# Patient Record
Sex: FEMALE | Race: OTHER | Employment: OTHER | URBAN - METROPOLITAN AREA
[De-identification: names, ages, dates, MRNs, and addresses within clinical notes are randomized per-mention and may not be internally consistent; named-entity substitution may affect disease eponyms.]

---

## 2017-08-15 ENCOUNTER — HOSPITAL ENCOUNTER (INPATIENT)
Facility: HOSPITAL | Age: 77
LOS: 2 days | Discharge: HOME | DRG: 123 | End: 2017-08-17
Attending: EMERGENCY MEDICINE | Admitting: HOSPITALIST
Payer: MEDICARE

## 2017-08-15 ENCOUNTER — APPOINTMENT (OUTPATIENT)
Dept: MRI IMAGING | Facility: HOSPITAL | Age: 77
DRG: 123 | End: 2017-08-15
Attending: EMERGENCY MEDICINE
Payer: MEDICARE

## 2017-08-15 DIAGNOSIS — H53.9 VISION CHANGES: Primary | ICD-10-CM

## 2017-08-15 DIAGNOSIS — H53.8 BLURRED VISION, LEFT EYE: ICD-10-CM

## 2017-08-15 PROBLEM — H46.9 OPTIC NEURITIS: Status: ACTIVE | Noted: 2017-08-15

## 2017-08-15 LAB
ANION GAP SERPL CALC-SCNC: 9 MMOL/L (ref 0–18)
APTT PPP: 25.1 SECONDS (ref 23.2–35.3)
BASOPHILS # BLD: 0.1 K/UL (ref 0–0.2)
BASOPHILS NFR BLD: 1 %
BUN SERPL-MCNC: 23 MG/DL (ref 8–20)
BUN/CREAT SERPL: 20 (ref 10–20)
CALCIUM SERPL-MCNC: 9.8 MG/DL (ref 8.5–10.5)
CHLORIDE SERPL-SCNC: 106 MMOL/L (ref 95–110)
CO2 SERPL-SCNC: 23 MMOL/L (ref 22–32)
CREAT SERPL-MCNC: 1.15 MG/DL (ref 0.5–1.5)
EOSINOPHIL # BLD: 0.7 K/UL (ref 0–0.7)
EOSINOPHIL NFR BLD: 5 %
ERYTHROCYTE [DISTWIDTH] IN BLOOD BY AUTOMATED COUNT: 16.1 % (ref 11–15)
ERYTHROCYTE [SEDIMENTATION RATE] IN BLOOD: 42 MM/HR (ref 0–30)
GLUCOSE SERPL-MCNC: 91 MG/DL (ref 70–99)
HCT VFR BLD AUTO: 34.8 % (ref 35–48)
HGB BLD-MCNC: 11.1 G/DL (ref 12–16)
INR BLD: 1 (ref 0.9–1.2)
LYMPHOCYTES # BLD: 4.2 K/UL (ref 1–4)
LYMPHOCYTES NFR BLD: 34 %
MCH RBC QN AUTO: 25.6 PG (ref 27–32)
MCHC RBC AUTO-ENTMCNC: 32 G/DL (ref 32–37)
MCV RBC AUTO: 79.9 FL (ref 80–100)
MONOCYTES # BLD: 0.9 K/UL (ref 0–1)
MONOCYTES NFR BLD: 8 %
NEUTROPHILS # BLD AUTO: 6.6 K/UL (ref 1.8–7.7)
NEUTROPHILS NFR BLD: 53 %
OSMOLALITY UR CALC.SUM OF ELEC: 289 MOSM/KG (ref 275–295)
PLATELET # BLD AUTO: 333 K/UL (ref 140–400)
PMV BLD AUTO: 8.5 FL (ref 7.4–10.3)
POTASSIUM SERPL-SCNC: 4.6 MMOL/L (ref 3.3–5.1)
PROTHROMBIN TIME: 12.3 SECONDS (ref 11.8–14.5)
RBC # BLD AUTO: 4.35 M/UL (ref 3.7–5.4)
SODIUM SERPL-SCNC: 138 MMOL/L (ref 136–144)
WBC # BLD AUTO: 12.4 K/UL (ref 4–11)

## 2017-08-15 PROCEDURE — 99223 1ST HOSP IP/OBS HIGH 75: CPT | Performed by: HOSPITALIST

## 2017-08-15 PROCEDURE — 70544 MR ANGIOGRAPHY HEAD W/O DYE: CPT | Performed by: EMERGENCY MEDICINE

## 2017-08-15 PROCEDURE — 70553 MRI BRAIN STEM W/O & W/DYE: CPT | Performed by: EMERGENCY MEDICINE

## 2017-08-15 RX ORDER — ASPIRIN 81 MG/1
81 TABLET, CHEWABLE ORAL DAILY
Status: DISCONTINUED | OUTPATIENT
Start: 2017-08-16 | End: 2017-08-17

## 2017-08-15 RX ORDER — ASPIRIN 325 MG
325 TABLET, DELAYED RELEASE (ENTERIC COATED) ORAL DAILY
Status: ON HOLD | COMMUNITY
End: 2017-08-17

## 2017-08-15 RX ORDER — DEXTROSE MONOHYDRATE 25 G/50ML
50 INJECTION, SOLUTION INTRAVENOUS AS NEEDED
Status: DISCONTINUED | OUTPATIENT
Start: 2017-08-15 | End: 2017-08-17

## 2017-08-15 RX ORDER — LORAZEPAM 2 MG/ML
INJECTION INTRAMUSCULAR
Status: DISPENSED
Start: 2017-08-15 | End: 2017-08-16

## 2017-08-15 RX ORDER — DILTIAZEM HYDROCHLORIDE 240 MG/1
240 CAPSULE, EXTENDED RELEASE ORAL DAILY
COMMUNITY

## 2017-08-15 RX ORDER — METHYLPREDNISOLONE SODIUM SUCCINATE 500 MG/1
500 INJECTION, POWDER, FOR SOLUTION INTRAMUSCULAR; INTRAVENOUS DAILY
Status: DISCONTINUED | OUTPATIENT
Start: 2017-08-16 | End: 2017-08-15

## 2017-08-15 RX ORDER — GLIMEPIRIDE 4 MG/1
4 TABLET ORAL
COMMUNITY

## 2017-08-15 RX ORDER — LOSARTAN POTASSIUM 50 MG/1
TABLET ORAL DAILY
COMMUNITY

## 2017-08-15 RX ORDER — SODIUM CHLORIDE 0.9 % (FLUSH) 0.9 %
3 SYRINGE (ML) INJECTION AS NEEDED
Status: DISCONTINUED | OUTPATIENT
Start: 2017-08-15 | End: 2017-08-17

## 2017-08-15 RX ORDER — MONTELUKAST SODIUM 10 MG/1
10 TABLET ORAL NIGHTLY
COMMUNITY

## 2017-08-15 RX ORDER — ACETAMINOPHEN 325 MG/1
650 TABLET ORAL EVERY 6 HOURS PRN
Status: DISCONTINUED | OUTPATIENT
Start: 2017-08-15 | End: 2017-08-17

## 2017-08-15 RX ORDER — METHYLPREDNISOLONE SODIUM SUCCINATE 500 MG/1
500 INJECTION, POWDER, FOR SOLUTION INTRAMUSCULAR; INTRAVENOUS ONCE
Status: COMPLETED | OUTPATIENT
Start: 2017-08-15 | End: 2017-08-15

## 2017-08-15 RX ORDER — ONDANSETRON 2 MG/ML
4 INJECTION INTRAMUSCULAR; INTRAVENOUS EVERY 6 HOURS PRN
Status: DISCONTINUED | OUTPATIENT
Start: 2017-08-15 | End: 2017-08-17

## 2017-08-15 RX ORDER — LORAZEPAM 2 MG/ML
0.5 INJECTION INTRAMUSCULAR ONCE
Status: COMPLETED | OUTPATIENT
Start: 2017-08-15 | End: 2017-08-15

## 2017-08-15 RX ORDER — ASPIRIN 81 MG/1
81 TABLET ORAL DAILY
Status: ON HOLD | COMMUNITY
End: 2017-08-17

## 2017-08-15 RX ORDER — BUPROPION HYDROCHLORIDE 150 MG/1
150 TABLET, EXTENDED RELEASE ORAL DAILY
COMMUNITY

## 2017-08-15 NOTE — ED INITIAL ASSESSMENT (HPI)
Pt came in from eye doctor for head scan due to decreasing vision. 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 Blurred vision in left eye since Sunday. Denies HA, only eye pain. Diagnosed with optic papillitis . Eyes dilated at office PTA.

## 2017-08-15 NOTE — ED NOTES
Family at bedside. Patient was sent here by eye doctor to get a scan of the left eye. Right eye is blind and the symptoms are the same. Patient is comfortable. No distress at the moment.

## 2017-08-15 NOTE — ED PROVIDER NOTES
Patient Seen in: [facility] AND [facility] Emergency Department    History   Patient presents with:   Eye Visual Problem (opthalmic)    Stated Complaint:     HPI    23-year-old female with history of diabetes, hypertension, asthma, and blindness to the right eye complaint:   Other systems are as noted in HPI. Constitutional and vital signs reviewed. All other systems reviewed and negative except as noted above. PSFH elements reviewed from today and agreed except as otherwise stated in HPI.     Physical Exa (*)     RDW 16.1 (*)     Lymphocyte Absolute 4.2 (*)     All other components within normal limits   PROTHROMBIN TIME (PT) - Normal   PTT, ACTIVATED - Normal   CBC WITH DIFFERENTIAL WITH PLATELET    Narrative:      The following orders were created for pane

## 2017-08-16 LAB
ANION GAP SERPL CALC-SCNC: 9 MMOL/L (ref 0–18)
BASOPHILS # BLD: 0 K/UL (ref 0–0.2)
BASOPHILS NFR BLD: 1 %
BUN SERPL-MCNC: 26 MG/DL (ref 8–20)
BUN/CREAT SERPL: 20.5 (ref 10–20)
CALCIUM SERPL-MCNC: 9.6 MG/DL (ref 8.5–10.5)
CHLORIDE SERPL-SCNC: 107 MMOL/L (ref 95–110)
CO2 SERPL-SCNC: 22 MMOL/L (ref 22–32)
CREAT SERPL-MCNC: 1.27 MG/DL (ref 0.5–1.5)
EOSINOPHIL # BLD: 0 K/UL (ref 0–0.7)
EOSINOPHIL NFR BLD: 0 %
ERYTHROCYTE [DISTWIDTH] IN BLOOD BY AUTOMATED COUNT: 16.3 % (ref 11–15)
GLUCOSE BLDC GLUCOMTR-MCNC: 246 MG/DL (ref 70–99)
GLUCOSE BLDC GLUCOMTR-MCNC: 269 MG/DL (ref 70–99)
GLUCOSE BLDC GLUCOMTR-MCNC: 284 MG/DL (ref 70–99)
GLUCOSE BLDC GLUCOMTR-MCNC: 330 MG/DL (ref 70–99)
GLUCOSE BLDC GLUCOMTR-MCNC: 344 MG/DL (ref 70–99)
GLUCOSE BLDC GLUCOMTR-MCNC: 434 MG/DL (ref 70–99)
GLUCOSE SERPL-MCNC: 312 MG/DL (ref 70–99)
HBA1C MFR BLD: 7 % (ref 4–6)
HCT VFR BLD AUTO: 33 % (ref 35–48)
HGB BLD-MCNC: 10.6 G/DL (ref 12–16)
LYMPHOCYTES # BLD: 1.1 K/UL (ref 1–4)
LYMPHOCYTES NFR BLD: 22 %
MCH RBC QN AUTO: 25.5 PG (ref 27–32)
MCHC RBC AUTO-ENTMCNC: 32.1 G/DL (ref 32–37)
MCV RBC AUTO: 79.5 FL (ref 80–100)
MONOCYTES # BLD: 0.1 K/UL (ref 0–1)
MONOCYTES NFR BLD: 1 %
NEUTROPHILS # BLD AUTO: 3.8 K/UL (ref 1.8–7.7)
NEUTROPHILS NFR BLD: 77 %
OSMOLALITY UR CALC.SUM OF ELEC: 303 MOSM/KG (ref 275–295)
PLATELET # BLD AUTO: 279 K/UL (ref 140–400)
PMV BLD AUTO: 8.6 FL (ref 7.4–10.3)
POTASSIUM SERPL-SCNC: 5 MMOL/L (ref 3.3–5.1)
RBC # BLD AUTO: 4.15 M/UL (ref 3.7–5.4)
SODIUM SERPL-SCNC: 138 MMOL/L (ref 136–144)
WBC # BLD AUTO: 5 K/UL (ref 4–11)

## 2017-08-16 PROCEDURE — 99232 SBSQ HOSP IP/OBS MODERATE 35: CPT | Performed by: HOSPITALIST

## 2017-08-16 RX ORDER — DILTIAZEM HYDROCHLORIDE 120 MG/1
240 CAPSULE, COATED, EXTENDED RELEASE ORAL DAILY
Status: DISCONTINUED | OUTPATIENT
Start: 2017-08-16 | End: 2017-08-17

## 2017-08-16 RX ORDER — MONTELUKAST SODIUM 10 MG/1
10 TABLET ORAL NIGHTLY
Status: DISCONTINUED | OUTPATIENT
Start: 2017-08-16 | End: 2017-08-17

## 2017-08-16 RX ORDER — BUPROPION HYDROCHLORIDE 150 MG/1
150 TABLET ORAL DAILY
Status: DISCONTINUED | OUTPATIENT
Start: 2017-08-16 | End: 2017-08-17

## 2017-08-16 RX ORDER — LOSARTAN POTASSIUM 50 MG/1
50 TABLET ORAL DAILY
Status: DISCONTINUED | OUTPATIENT
Start: 2017-08-16 | End: 2017-08-17

## 2017-08-16 NOTE — PROGRESS NOTES
[facility]    Progress Note    Rickford Lesches Patient Status:  Inpatient    1940 MRN [id number]   Location [facility] 3W/SW Attending Yu Pavon MD   Hosp Day # 1 PCP No primary care provider on file.        Keith Subcutaneous TID CC   • aspirin  81 mg Oral Daily   • methylPREDNISolone (Solu-MEDROL) IVPB  500 mg Intravenous Daily       Current PRN Inpatient Meds:      Normal Saline Flush, acetaminophen, ondansetron HCl, dextrose 50%, Glucose-Vitamin C    Results: involving the inner table of the bifrontal calvarium.       Ekg 12-lead    Result Date: 8/15/2017  ECG Report  Interpretation  -------------------------- Sinus Rhythm WITHIN NORMAL LIMITS No previous ECGs available Electronically signed on 08/16/2017 at 13:

## 2017-08-16 NOTE — PLAN OF CARE
Diabetes/Glucose Control    • Glucose maintained within prescribed range Not Progressing          Patient Centered Care    • Patient preferences are identified and integrated in the patient's plan of care Progressing        Bed low, locked.  Side rails up x

## 2017-08-16 NOTE — ED NOTES
Charge nurse and Dr Denise Dougherty notified that [address] unable to do US due to children present. Charge to fined someone to watch children.

## 2017-08-16 NOTE — H&P
[facility]    PATIENT'S NAME: Estrellita Aroldo   ATTENDING PHYSICIAN: Mathieu Hernandez MD   PATIENT ACCOUNT#:   [id number]    LOCATION:  [address]  MEDICAL RECORD #:   [id number]       YOB: 1940  ADMISSION DATE: PHYSICAL EXAMINATION:    GENERAL:  Alert, oriented to time, place, and person. No acute distress. VITAL SIGNS:  Temperature 98.0, pulse 76, respiratory rate 15, blood pressure 170/80, pulse ox 99% on room air. HEENT:  Atraumatic. Oropharynx clear.

## 2017-08-16 NOTE — CONSULTS
[facility]    PATIENT'S NAME: Oscar Nielsen   ATTENDING PHYSICIAN: Greg Souza MD   CONSULTING PHYSICIAN: Sharyle Asa.  Olga Romero MD   PATIENT ACCOUNT#:   [id number]    LOCATION:  [address] [place] [address] #:   [id number]       DATE OF BI rashes, sores, joint pain, or swelling. NEUROLOGIC:  Her  is symmetric. Her toes are downgoing. She has no sign of demyelinating disease on the MRI. ASSESSMENT AND PLAN:  Optic neuritis.   Given her age, this is more likely ischemic, although th

## 2017-08-17 ENCOUNTER — APPOINTMENT (OUTPATIENT)
Dept: CV DIAGNOSTICS | Facility: HOSPITAL | Age: 77
DRG: 123 | End: 2017-08-17
Attending: HOSPITALIST
Payer: MEDICARE

## 2017-08-17 VITALS
DIASTOLIC BLOOD PRESSURE: 68 MMHG | SYSTOLIC BLOOD PRESSURE: 162 MMHG | OXYGEN SATURATION: 96 % | HEART RATE: 79 BPM | RESPIRATION RATE: 18 BRPM | BODY MASS INDEX: 32.01 KG/M2 | TEMPERATURE: 99 F | HEIGHT: 58 IN | WEIGHT: 152.5 LBS

## 2017-08-17 LAB
ANION GAP SERPL CALC-SCNC: 9 MMOL/L (ref 0–18)
BASOPHILS # BLD: 0 K/UL (ref 0–0.2)
BASOPHILS NFR BLD: 0 %
BUN SERPL-MCNC: 31 MG/DL (ref 8–20)
BUN/CREAT SERPL: 26.7 (ref 10–20)
CALCIUM SERPL-MCNC: 9.2 MG/DL (ref 8.5–10.5)
CHLORIDE SERPL-SCNC: 107 MMOL/L (ref 95–110)
CO2 SERPL-SCNC: 22 MMOL/L (ref 22–32)
CREAT SERPL-MCNC: 1.16 MG/DL (ref 0.5–1.5)
EOSINOPHIL # BLD: 0 K/UL (ref 0–0.7)
EOSINOPHIL NFR BLD: 0 %
ERYTHROCYTE [DISTWIDTH] IN BLOOD BY AUTOMATED COUNT: 16.2 % (ref 11–15)
GLUCOSE BLDC GLUCOMTR-MCNC: 260 MG/DL (ref 70–99)
GLUCOSE BLDC GLUCOMTR-MCNC: 279 MG/DL (ref 70–99)
GLUCOSE BLDC GLUCOMTR-MCNC: 291 MG/DL (ref 70–99)
GLUCOSE BLDC GLUCOMTR-MCNC: 365 MG/DL (ref 70–99)
GLUCOSE SERPL-MCNC: 281 MG/DL (ref 70–99)
HCT VFR BLD AUTO: 31.1 % (ref 35–48)
HGB BLD-MCNC: 9.9 G/DL (ref 12–16)
LYMPHOCYTES # BLD: 1.2 K/UL (ref 1–4)
LYMPHOCYTES NFR BLD: 8 %
MCH RBC QN AUTO: 25.2 PG (ref 27–32)
MCHC RBC AUTO-ENTMCNC: 31.7 G/DL (ref 32–37)
MCV RBC AUTO: 79.5 FL (ref 80–100)
MONOCYTES # BLD: 0.4 K/UL (ref 0–1)
MONOCYTES NFR BLD: 2 %
NEUTROPHILS # BLD AUTO: 13.2 K/UL (ref 1.8–7.7)
NEUTROPHILS NFR BLD: 89 %
OSMOLALITY UR CALC.SUM OF ELEC: 303 MOSM/KG (ref 275–295)
PLATELET # BLD AUTO: 296 K/UL (ref 140–400)
PMV BLD AUTO: 9.6 FL (ref 7.4–10.3)
POTASSIUM SERPL-SCNC: 3.6 MMOL/L (ref 3.3–5.1)
RBC # BLD AUTO: 3.91 M/UL (ref 3.7–5.4)
SODIUM SERPL-SCNC: 138 MMOL/L (ref 136–144)
WBC # BLD AUTO: 14.7 K/UL (ref 4–11)

## 2017-08-17 PROCEDURE — 99239 HOSP IP/OBS DSCHRG MGMT >30: CPT | Performed by: HOSPITALIST

## 2017-08-17 PROCEDURE — 93306 TTE W/DOPPLER COMPLETE: CPT | Performed by: HOSPITALIST

## 2017-08-17 RX ORDER — CLOPIDOGREL BISULFATE 75 MG/1
75 TABLET ORAL DAILY
Qty: 30 TABLET | Refills: 0 | Status: SHIPPED | OUTPATIENT
Start: 2017-08-18

## 2017-08-17 RX ORDER — PREDNISONE 10 MG/1
TABLET ORAL
Qty: 10 TABLET | Refills: 0 | Status: SHIPPED | OUTPATIENT
Start: 2017-08-17

## 2017-08-17 RX ORDER — CLOPIDOGREL BISULFATE 75 MG/1
75 TABLET ORAL DAILY
Status: DISCONTINUED | OUTPATIENT
Start: 2017-08-17 | End: 2017-08-17

## 2017-08-17 RX ORDER — HYDRALAZINE HYDROCHLORIDE 20 MG/ML
5 INJECTION INTRAMUSCULAR; INTRAVENOUS ONCE
Status: COMPLETED | OUTPATIENT
Start: 2017-08-17 | End: 2017-08-17

## 2017-08-17 NOTE — PROGRESS NOTES
08/16/17  1004 08/16/17  1346 08/16/17  1620 08/16/17 2041   BP: (!) 176/83 (!) 164/70 157/73 138/66   Pulse: 102 96 85 87   Resp: 18 18 18 18   Temp: 98.5 °F (36.9 °C)   98.2 °F (36.8 °C)   TempSrc: Oral   Oral   SpO2: 95% 94%  93%   Weight:       Heigh

## 2017-08-17 NOTE — OCCUPATIONAL THERAPY NOTE
OCCUPATIONAL THERAPY EVALUATION - INPATIENT     Room Number: 337/337-A  Evaluation Date: 8/17/2017  Type of Evaluation: Initial  Presenting Problem:  (decreased vision left eye with pain)    Physician Order: IP Consult to Occupational Therapy  Reason for T Blind right eye    • Diabetes [facility])    • Essential hypertension    • Ischemic optic neuropathy of right eye    • Optic papillitis of left eye        Past Surgical History  Past Surgical History:  No date: CATARACT EXTRACTION      Comment: right  No date: T Toileting, which includes using toilet, bedpan or urinal? : None  -   Putting on and taking off regular upper body clothing?: None  -   Taking care of personal grooming such as brushing teeth?: None  -   Eating meals?: None    AM-PAC Score:  Score: 24  Pinky

## 2017-08-17 NOTE — DISCHARGE SUMMARY
[facility]    Discharge Summary    Roslyn Mitchell Patient Status:  Inpatient    1940 MRN [id number]   Location [facility] 3W/SW Attending Lauren Howe MD   Hosp Day # 2 PCP No primary care provider on file. infarction. Multifocal nonacute cerebellar lacunar infarcts. Nonspecific white matter changes most likely related to chronic microvascular ischemia. Paranasal sinus disease. Bilateral mastoid effusions.   Nonspecific apparent erosive changes involving t of these medications  · Clopidogrel Bisulfate 75 MG Tabs  · predniSONE 10 MG Tabs         Follow up Visits  MD Jennifer Davenport [phone]    Schedule an appointment as soon as possible for a visit in 1 mon

## 2017-08-17 NOTE — PHYSICAL THERAPY NOTE
PHYSICAL THERAPY EVALUATION - INPATIENT     Room Number: 337/337-A  Evaluation Date: 8/17/2017  Type of Evaluation: Initial  Physician Order: PT Eval and Treat    Presenting Problem: vision changes  Reason for Therapy: Mobility Dysfunction and Discharg Problem:    Vision changes  Active Problems:    Blurred vision, left eye    Optic neuritis      Past Medical History  Past Medical History:   Diagnosis Date   • Asthma    • Blind right eye    • Diabetes ([address]    • Essential hypertension    • Ischemic optic sheets and blankets)?: A Little   -   Sitting down on and standing up from a chair with arms (e.g., wheelchair, bedside commode, etc.): A Little   -   Moving from lying on back to sitting on the side of the bed?: A Little   How much help from another perso Status    Goal #5 Patient to demonstrate independence with home activity/exercise instructions provided to patient in preparation for discharge.    Goal #5   Current Status    Goal #6    Goal #6  Current Status

## 2017-08-18 ENCOUNTER — TELEPHONE (OUTPATIENT)
Dept: CARDIOLOGY UNIT | Facility: HOSPITAL | Age: 77
End: 2017-08-18

## 2017-08-19 ENCOUNTER — TELEPHONE (OUTPATIENT)
Dept: CARDIOLOGY UNIT | Facility: HOSPITAL | Age: 77
End: 2017-08-19

## 2017-10-09 ENCOUNTER — LAB ENCOUNTER (OUTPATIENT)
Dept: LAB | Facility: HOSPITAL | Age: 77
End: 2017-10-09
Attending: INTERNAL MEDICINE
Payer: MEDICARE

## 2017-10-09 ENCOUNTER — OFFICE VISIT (OUTPATIENT)
Dept: RHEUMATOLOGY | Facility: CLINIC | Age: 77
End: 2017-10-09

## 2017-10-09 VITALS
HEART RATE: 87 BPM | BODY MASS INDEX: 30.23 KG/M2 | SYSTOLIC BLOOD PRESSURE: 156 MMHG | HEIGHT: 60 IN | WEIGHT: 154 LBS | DIASTOLIC BLOOD PRESSURE: 79 MMHG

## 2017-10-09 DIAGNOSIS — H46.9 OPTIC NEURITIS: ICD-10-CM

## 2017-10-09 DIAGNOSIS — M25.50 ARTHRALGIA, UNSPECIFIED JOINT: ICD-10-CM

## 2017-10-09 DIAGNOSIS — R76.8 POSITIVE ANA (ANTINUCLEAR ANTIBODY): ICD-10-CM

## 2017-10-09 DIAGNOSIS — R53.83 FATIGUE, UNSPECIFIED TYPE: ICD-10-CM

## 2017-10-09 DIAGNOSIS — H46.9 OPTIC NEURITIS: Primary | ICD-10-CM

## 2017-10-09 PROCEDURE — 80053 COMPREHEN METABOLIC PANEL: CPT

## 2017-10-09 PROCEDURE — 86140 C-REACTIVE PROTEIN: CPT

## 2017-10-09 PROCEDURE — 86431 RHEUMATOID FACTOR QUANT: CPT

## 2017-10-09 PROCEDURE — 99204 OFFICE O/P NEW MOD 45 MIN: CPT | Performed by: INTERNAL MEDICINE

## 2017-10-09 PROCEDURE — 86256 FLUORESCENT ANTIBODY TITER: CPT

## 2017-10-09 PROCEDURE — 36415 COLL VENOUS BLD VENIPUNCTURE: CPT

## 2017-10-09 PROCEDURE — 86160 COMPLEMENT ANTIGEN: CPT

## 2017-10-09 PROCEDURE — 84165 PROTEIN E-PHORESIS SERUM: CPT

## 2017-10-09 PROCEDURE — 86038 ANTINUCLEAR ANTIBODIES: CPT

## 2017-10-09 PROCEDURE — 86039 ANTINUCLEAR ANTIBODIES (ANA): CPT

## 2017-10-09 PROCEDURE — 82164 ANGIOTENSIN I ENZYME TEST: CPT

## 2017-10-09 PROCEDURE — 83516 IMMUNOASSAY NONANTIBODY: CPT

## 2017-10-09 PROCEDURE — 86255 FLUORESCENT ANTIBODY SCREEN: CPT

## 2017-10-09 PROCEDURE — 86225 DNA ANTIBODY NATIVE: CPT

## 2017-10-09 PROCEDURE — 83876 ASSAY MYELOPEROXIDASE: CPT

## 2017-10-09 PROCEDURE — 86235 NUCLEAR ANTIGEN ANTIBODY: CPT

## 2017-10-09 PROCEDURE — 82550 ASSAY OF CK (CPK): CPT

## 2017-10-09 PROCEDURE — 85025 COMPLETE CBC W/AUTO DIFF WBC: CPT

## 2017-10-09 PROCEDURE — G0463 HOSPITAL OUTPT CLINIC VISIT: HCPCS | Performed by: INTERNAL MEDICINE

## 2017-10-09 PROCEDURE — 85652 RBC SED RATE AUTOMATED: CPT

## 2017-10-09 NOTE — PROGRESS NOTES
Dear Dr. Annalisa Stevenson:    I saw your patient Conrad Saavedra in consultation this afternoon at your request for evaluation of possible temporal arteritis. Her daughter is here to translate her [place].     She states that in 1970 she had a miscarr finishing prednisone, her vision worsened. On September 21st, C-reactive protein was 6.21, sed rate 56, CMP normal except creatinine of 1.26. She is back on prednisone in unknown dose, and her vision has again improved.   Since her discharge, she has not diarrhea, blood in her stools. No trouble urinating. Some dry mouth but no dry eyes. No red nodes. No headache. Physical exam:  Pleasant woman in no acute distress. Blood pressure 156/79, pulse 87, height 5' (1.524 m), weight 154 lb (69.9 kg).   No ra

## 2017-10-24 ENCOUNTER — TELEPHONE (OUTPATIENT)
Dept: RHEUMATOLOGY | Facility: CLINIC | Age: 77
End: 2017-10-24

## 2017-10-24 NOTE — TELEPHONE ENCOUNTER
Lani's labs show that her CPK is normal at 46. PARKER is 1-320 speckled and 1-160 homogeneous. SPEP normal.  CMP normal except glucose of 247 and creatinine of 1.29, which is stable.   She has had urinalyses through Dr. Pushpa Abbasi, which have been

## 2023-10-12 ENCOUNTER — APPOINTMENT (OUTPATIENT)
Dept: GENERAL RADIOLOGY | Age: 83
End: 2023-10-12
Attending: NURSE PRACTITIONER
Payer: MEDICARE

## 2023-10-12 ENCOUNTER — HOSPITAL ENCOUNTER (OUTPATIENT)
Age: 83
Discharge: HOME | End: 2023-10-12
Payer: MEDICARE

## 2023-10-12 VITALS
SYSTOLIC BLOOD PRESSURE: 156 MMHG | DIASTOLIC BLOOD PRESSURE: 76 MMHG | RESPIRATION RATE: 18 BRPM | OXYGEN SATURATION: 99 % | TEMPERATURE: 98 F | HEART RATE: 85 BPM

## 2023-10-12 DIAGNOSIS — M25.579 ANKLE PAIN: ICD-10-CM

## 2023-10-12 DIAGNOSIS — S93.401A SPRAIN OF RIGHT ANKLE, UNSPECIFIED LIGAMENT, INITIAL ENCOUNTER: Primary | ICD-10-CM

## 2023-10-12 PROCEDURE — 73610 X-RAY EXAM OF ANKLE: CPT | Performed by: NURSE PRACTITIONER

## 2023-10-12 NOTE — ED INITIAL ASSESSMENT (HPI)
Pt presents to the IC with daughter with c/o right ankle pain. Daughter states she was outside in the yard & rolled ankle this past weekend. Denies fall. Uses cane for ambulation.

## 2023-10-12 NOTE — DISCHARGE INSTRUCTIONS
Rest  Ice over clothing 20 minutes at a time a few times per day  Elevate on 2 pillows when at rest  Tylenol 650 mg every 4-6 hours as needed for pain

## 2025-01-10 ENCOUNTER — HOSPITAL ENCOUNTER (EMERGENCY)
Facility: HOSPITAL | Age: 85
Discharge: HOME | End: 2025-01-10
Attending: EMERGENCY MEDICINE
Payer: MEDICARE

## 2025-01-10 ENCOUNTER — APPOINTMENT (OUTPATIENT)
Dept: GENERAL RADIOLOGY | Facility: HOSPITAL | Age: 85
End: 2025-01-10
Attending: EMERGENCY MEDICINE
Payer: MEDICARE

## 2025-01-10 VITALS
RESPIRATION RATE: 20 BRPM | HEART RATE: 78 BPM | TEMPERATURE: 97 F | DIASTOLIC BLOOD PRESSURE: 74 MMHG | SYSTOLIC BLOOD PRESSURE: 164 MMHG | OXYGEN SATURATION: 96 %

## 2025-01-10 DIAGNOSIS — M54.50 ACUTE BILATERAL LOW BACK PAIN WITHOUT SCIATICA: Primary | ICD-10-CM

## 2025-01-10 PROCEDURE — 99284 EMERGENCY DEPT VISIT MOD MDM: CPT

## 2025-01-10 PROCEDURE — 72100 X-RAY EXAM L-S SPINE 2/3 VWS: CPT | Performed by: EMERGENCY MEDICINE

## 2025-01-10 PROCEDURE — 99285 EMERGENCY DEPT VISIT HI MDM: CPT

## 2025-01-10 RX ORDER — NAPROXEN 500 MG/1
500 TABLET ORAL ONCE
Status: COMPLETED | OUTPATIENT
Start: 2025-01-10 | End: 2025-01-10

## 2025-01-10 RX ORDER — ACETAMINOPHEN 325 MG/1
650 TABLET ORAL ONCE
Status: COMPLETED | OUTPATIENT
Start: 2025-01-10 | End: 2025-01-10

## 2025-01-10 RX ORDER — LIDOCAINE 50 MG/G
1 PATCH TOPICAL EVERY 24 HOURS
Qty: 14 PATCH | Refills: 0 | Status: SHIPPED | OUTPATIENT
Start: 2025-01-10 | End: 2025-01-24

## 2025-01-10 RX ORDER — NAPROXEN 500 MG/1
500 TABLET ORAL 2 TIMES DAILY PRN
Qty: 30 TABLET | Refills: 0 | Status: SHIPPED | OUTPATIENT
Start: 2025-01-10 | End: 2025-01-25

## 2025-01-10 NOTE — ED INITIAL ASSESSMENT (HPI)
Pt arrived per EMS from home. Pt has been having back pain for 1 1/2 months. Has been taking tylenol and ibuprofen, but not helping. PT called daughter saying she couldn't get out of bed. EMS reports pt out of bed and ambulating upon arrival. Pt is Green[place]ic speaking.

## 2025-01-11 NOTE — ED PROVIDER NOTES
Patient Seen in: [facility] Emergency Department      History     Chief Complaint   Patient presents with    Back Pain     Stated Complaint: back pain    Subjective:   HPI          Objective:     Past Medical History:    Asthma (HCC)    Blind right eye    Diabetes (HCC)    Essential hypertension    Ischemic optic neuropathy of right eye    Optic papillitis of left eye              Past Surgical History:   Procedure Laterality Date    Cataract extraction      right    Total abdom hysterectomy                  Social History     Socioeconomic History    Marital status:    Tobacco Use    Smoking status: Never    Smokeless tobacco: Never                  Physical Exam     ED Triage Vitals [01/10/25 1535]   BP (!) 178/89   Pulse 90   Resp 20   Temp 97.3 °F (36.3 °C)   Temp src Temporal   SpO2 98 %   O2 Device None (Room air)       Current Vitals:   Vital Signs  BP: (!) 164/74  Pulse: 78  Resp: 20  Temp: 97.3 °F (36.3 °C)  Temp src: Temporal  MAP (mmHg): (!) 102    Oxygen Therapy  SpO2: 96 %  O2 Device: None (Room air)        Physical Exam        ED Course   Labs Reviewed - No data to display                MDM      84-year-old female with a history of hypertension, diabetes, and chronic pain in multiple joints and her back presents today with acute on chronic pain primarily in her back.  Patient states her pains been increased for the last 1.5 months.  She had difficulty getting out of bed today prompting her visit to the ED.  Describes pain in her low back radiating towards her leg and in her right lower extremity though this pain is resolved on arrival.  She takes Tylenol last taken last night.  Denies any falls, injury, numbness/weakness, fevers, or other illness at this time.    On exam, vitals reassuring, generally well-appearing, baseline mental status, some tenderness to palpation of the midline low back and paraspinal muscles.  Sensation and strength grossly intact in the distal lower  extremities.    Differential: Acute on chronic low back pain, arthralgias, pathologic fracture    Patient treated for pain with Tylenol, naproxen, and a lidocaine patch.    Will plan to eval with x-ray of the L-spine with plans to discharge for close PMD follow-up if patient ambulatory in no acute findings.        Medical Decision Making      Disposition and Plan     Clinical Impression:  1. Acute bilateral low back pain without sciatica         Disposition:  Discharge  1/10/2025  7:42 pm    Follow-up:  Arturo Gates  [address] [address]  [phone]    Call in 2 day(s)      We recommend that you schedule follow up care with a primary care provider within the next three months to obtain basic health screening including reassessment of your blood pressure.      Medications Prescribed:  Discharge Medication List as of 1/10/2025  7:54 PM        START taking these medications    Details   lidocaine 5 % External Patch Place 1 patch onto the skin daily for 14 days., Print, Disp-14 patch, R-0      naproxen 500 MG Oral Tab Take 1 tablet (500 mg total) by mouth 2 (two) times daily as needed., Normal, Disp-30 tablet, R-0                 Supplementary Documentation:

## 2025-01-11 NOTE — ED PROVIDER NOTES
Signed out by patient to follow-up results of x-rays.  There are no acute findings on lumbar films.  Discussed results with patient and they are comfortable discharge.

## 2025-01-30 ENCOUNTER — LAB REQUISITION (OUTPATIENT)
Dept: LAB | Facility: HOSPITAL | Age: 85
End: 2025-01-30
Payer: MEDICARE

## 2025-01-30 DIAGNOSIS — E11.22 TYPE 2 DIABETES MELLITUS WITH DIABETIC CHRONIC KIDNEY DISEASE (HCC): ICD-10-CM

## 2025-01-30 LAB
ALBUMIN SERPL-MCNC: 4.5 G/DL (ref 3.2–4.8)
ALBUMIN/GLOB SERPL: 1.7 {RATIO} (ref 1–2)
ALP LIVER SERPL-CCNC: 78 U/L
ALT SERPL-CCNC: 16 U/L
ANION GAP SERPL CALC-SCNC: 12 MMOL/L (ref 0–18)
AST SERPL-CCNC: 17 U/L (ref ?–34)
BILIRUB SERPL-MCNC: 0.3 MG/DL (ref 0.2–1.1)
BUN BLD-MCNC: 41 MG/DL (ref 9–23)
CALCIUM BLD-MCNC: 9.4 MG/DL (ref 8.7–10.6)
CHLORIDE SERPL-SCNC: 102 MMOL/L (ref 98–112)
CO2 SERPL-SCNC: 23 MMOL/L (ref 21–32)
CREAT BLD-MCNC: 1.46 MG/DL
EGFRCR SERPLBLD CKD-EPI 2021: 35 ML/MIN/1.73M2 (ref 60–?)
EST. AVERAGE GLUCOSE BLD GHB EST-MCNC: 128 MG/DL (ref 68–126)
GLOBULIN PLAS-MCNC: 2.7 G/DL (ref 2–3.5)
GLUCOSE BLD-MCNC: 167 MG/DL (ref 70–99)
HBA1C MFR BLD: 6.1 % (ref ?–5.7)
OSMOLALITY SERPL CALC.SUM OF ELEC: 298 MOSM/KG (ref 275–295)
POTASSIUM SERPL-SCNC: 4.2 MMOL/L (ref 3.5–5.1)
PROT SERPL-MCNC: 7.2 G/DL (ref 5.7–8.2)
SODIUM SERPL-SCNC: 137 MMOL/L (ref 136–145)

## 2025-01-30 PROCEDURE — 80053 COMPREHEN METABOLIC PANEL: CPT | Performed by: INTERNAL MEDICINE

## 2025-01-30 PROCEDURE — 83036 HEMOGLOBIN GLYCOSYLATED A1C: CPT | Performed by: INTERNAL MEDICINE

## 2025-03-07 VITALS
TEMPERATURE: 98 F | SYSTOLIC BLOOD PRESSURE: 161 MMHG | RESPIRATION RATE: 18 BRPM | DIASTOLIC BLOOD PRESSURE: 71 MMHG | WEIGHT: 120 LBS | OXYGEN SATURATION: 98 % | BODY MASS INDEX: 23 KG/M2 | HEART RATE: 79 BPM

## 2025-03-07 PROCEDURE — 99283 EMERGENCY DEPT VISIT LOW MDM: CPT

## 2025-03-07 PROCEDURE — 99284 EMERGENCY DEPT VISIT MOD MDM: CPT

## 2025-03-08 ENCOUNTER — HOSPITAL ENCOUNTER (EMERGENCY)
Facility: HOSPITAL | Age: 85
Discharge: HOME | End: 2025-03-08
Attending: EMERGENCY MEDICINE
Payer: MEDICARE

## 2025-03-08 DIAGNOSIS — L50.9 URTICARIA: Primary | ICD-10-CM

## 2025-03-08 LAB — GLUCOSE BLDC GLUCOMTR-MCNC: 109 MG/DL (ref 70–99)

## 2025-03-08 PROCEDURE — 82962 GLUCOSE BLOOD TEST: CPT

## 2025-03-08 RX ORDER — PREDNISONE 20 MG/1
40 TABLET ORAL ONCE
Status: DISCONTINUED | OUTPATIENT
Start: 2025-03-08 | End: 2025-03-08

## 2025-03-08 RX ORDER — PREDNISONE 5 MG/1
5 TABLET ORAL DAILY
Qty: 5 TABLET | Refills: 0 | Status: SHIPPED | OUTPATIENT
Start: 2025-03-08 | End: 2025-03-13

## 2025-03-08 RX ORDER — FAMOTIDINE 20 MG/1
40 TABLET, FILM COATED ORAL ONCE
Status: COMPLETED | OUTPATIENT
Start: 2025-03-08 | End: 2025-03-08

## 2025-03-08 RX ORDER — DIPHENHYDRAMINE HCL 25 MG
25 CAPSULE ORAL ONCE
Status: COMPLETED | OUTPATIENT
Start: 2025-03-08 | End: 2025-03-08

## 2025-03-08 RX ORDER — DIPHENHYDRAMINE HCL 25 MG
25 CAPSULE ORAL EVERY 8 HOURS PRN
Qty: 15 CAPSULE | Refills: 0 | Status: SHIPPED | OUTPATIENT
Start: 2025-03-08 | End: 2025-03-13

## 2025-03-08 RX ORDER — FAMOTIDINE 20 MG/1
20 TABLET, FILM COATED ORAL 2 TIMES DAILY
Qty: 10 TABLET | Refills: 0 | Status: SHIPPED | OUTPATIENT
Start: 2025-03-08 | End: 2025-03-13

## 2025-03-08 NOTE — ED PROVIDER NOTES
Patient Seen in: [facility] Emergency Department    History     Chief Complaint   Patient presents with    Rash Skin Problem     Stated Complaint: rash/itching     HPI    85 yo F with PMH DM, HTN, right eye blindness secondary to optic neuritis for which patient on 5mg prednisone presenting with daughter for evaluation of three days of pruritic truncal and extremity rash. No medications PTA; no overt/obvious inciting factor. No abd pain or vomiting/diarrhea. No throat/tongue/lip swelling.    Past Medical History:    Asthma (HCC)    Blind right eye    Diabetes (HCC)    Essential hypertension    Ischemic optic neuropathy of right eye    Optic papillitis of left eye       Past Surgical History:   Procedure Laterality Date    Cataract extraction      right    Total abdom hysterectomy              No family history on file.    Social History     Socioeconomic History    Marital status:    Tobacco Use    Smoking status: Never    Smokeless tobacco: Never   Vaping Use    Vaping status: Never Used   Substance and Sexual Activity    Alcohol use: Never    Drug use: Never       Review of Systems :  Constitutional: As per HPI  Skin: (+) rash/itching.      Positive for stated complaint: rash/itching  Other systems are as noted in HPI.  Constitutional and vital signs reviewed.      All other systems reviewed and negative except as noted above.    PSFH elements reviewed from today and agreed except as otherwise stated in HPI.    Physical Exam     ED Triage Vitals [03/07/25 2342]   BP (!) 161/71   Pulse 79   Resp 18   Temp 97.5 °F (36.4 °C)   Temp src Temporal   SpO2 98 %   O2 Device None (Room air)       Current:BP (!) 161/71   Pulse 79   Temp 97.5 °F (36.4 °C) (Temporal)   Resp 18   Wt 54.4 kg   SpO2 98%   BMI 23.44 kg/m²         Physical Exam   Constitutional: No distress.  Nontoxic, well appearing.  HEENT: MMM.  No tongue or lip swelling.  No drooling or stridor.  Head: Normocephalic.   Eyes: No injection.   No periorbital edema.  Cardiovascular: RRR.   Pulmonary/Chest: Effort normal. CTAB.  Abdominal: Soft. Nontender.  Musculoskeletal: No gross deformity.  Neurological: Alert.   Skin: Skin is warm.  Diffuse urticarial change worse to lower greater than upper extremities without excoriative or superimposed cellulitic change.  No crepitance with soft compartments.  Psychiatric: Cooperative.  Nursing note and vitals reviewed.        ED Course     Labs Reviewed   POCT GLUCOSE - Abnormal; Notable for the following components:       Result Value    POC Glucose  109 (*)     All other components within normal limits            MDM   DIFFERENTIAL DIAGNOSIS: After history and physical exam differential diagnosis includes but is not limited to urticaria.    Pulse ox: 98%:Normal on RA, as independently interpreted by myself    Medical Decision Making  Evaluation for several days of urticaria without clear inciting factor and without evidence for anaphylaxis/angioedema with patient on chronic/long-standing 5mg prednisone; benadryl/famotidine/decadron initiated; will discharge with ongoing meds and outpatient followup.    Problems Addressed:  Urticaria: acute illness or injury    Amount and/or Complexity of Data Reviewed  Independent Historian: caregiver     Details: Daughter at bedside for collateral history    Risk  Prescription drug management.      I was wearing at minimum a facemask and eye protection throughout this encounter with handwashing performed prior and after patient evaluation without personal hand/facial/oropharyngeal contact and gloves worn throughout encounter. See note and/or contact this provider for further PPE details.    Disposition and Plan     Clinical Impression:  1. Urticaria        Disposition:  Discharge    Follow-up:  Damon Benitez MD (I)  [address] [place] [id number] [phone]    Call  For followup and re-evaluation.      Medications Prescribed:  Discharge Medication List as  of 3/8/2025  1:39 AM        START taking these medications    Details   diphenhydrAMINE 25 MG Oral Cap Take 1 capsule (25 mg total) by mouth every 8 (eight) hours as needed for Itching or Allergies., Normal, Disp-15 capsule, R-0      famotidine 20 MG Oral Tab Take 1 tablet (20 mg total) by mouth 2 (two) times daily for 5 days., Normal, Disp-10 tablet, R-0

## 2025-03-08 NOTE — ED INITIAL ASSESSMENT (HPI)
Pt present to ed with c/o rash. Pt daughter states pt has had a rash all over her body x 2 days but worsened today. +itching and painful.     No meds pta.     Yi interp declined, pt daughter to translate

## 2025-03-30 ENCOUNTER — HOSPITAL ENCOUNTER (OUTPATIENT)
Facility: HOSPITAL | Age: 85
Setting detail: OBSERVATION
Discharge: HOME | End: 2025-03-31
Attending: EMERGENCY MEDICINE | Admitting: HOSPITALIST
Payer: MEDICARE

## 2025-03-30 ENCOUNTER — APPOINTMENT (OUTPATIENT)
Dept: GENERAL RADIOLOGY | Facility: HOSPITAL | Age: 85
End: 2025-03-30
Attending: EMERGENCY MEDICINE
Payer: MEDICARE

## 2025-03-30 DIAGNOSIS — R07.9 ACUTE CHEST PAIN: Primary | ICD-10-CM

## 2025-03-30 LAB
ALBUMIN SERPL-MCNC: 4.5 G/DL (ref 3.2–4.8)
ALBUMIN/GLOB SERPL: 2 {RATIO} (ref 1–2)
ALP LIVER SERPL-CCNC: 96 U/L
ALT SERPL-CCNC: 16 U/L
ANION GAP SERPL CALC-SCNC: 10 MMOL/L (ref 0–18)
AST SERPL-CCNC: 16 U/L (ref ?–34)
BASOPHILS # BLD AUTO: 0.04 X10(3) UL (ref 0–0.2)
BASOPHILS NFR BLD AUTO: 0.4 %
BILIRUB SERPL-MCNC: 0.2 MG/DL (ref 0.2–1.1)
BUN BLD-MCNC: 37 MG/DL (ref 9–23)
BUN/CREAT SERPL: 26.2 (ref 10–20)
CALCIUM BLD-MCNC: 9.2 MG/DL (ref 8.7–10.4)
CHLORIDE SERPL-SCNC: 105 MMOL/L (ref 98–112)
CO2 SERPL-SCNC: 21 MMOL/L (ref 21–32)
CREAT BLD-MCNC: 1.41 MG/DL
DEPRECATED RDW RBC AUTO: 45.5 FL (ref 35.1–46.3)
EGFRCR SERPLBLD CKD-EPI 2021: 37 ML/MIN/1.73M2 (ref 60–?)
EOSINOPHIL # BLD AUTO: 0.53 X10(3) UL (ref 0–0.7)
EOSINOPHIL NFR BLD AUTO: 5.2 %
ERYTHROCYTE [DISTWIDTH] IN BLOOD BY AUTOMATED COUNT: 12.8 % (ref 11–15)
FLUAV + FLUBV RNA SPEC NAA+PROBE: NEGATIVE
FLUAV + FLUBV RNA SPEC NAA+PROBE: NEGATIVE
GLOBULIN PLAS-MCNC: 2.2 G/DL (ref 2–3.5)
GLUCOSE BLD-MCNC: 171 MG/DL (ref 70–99)
HCT VFR BLD AUTO: 32 %
HGB BLD-MCNC: 10.6 G/DL
IMM GRANULOCYTES # BLD AUTO: 0.03 X10(3) UL (ref 0–1)
IMM GRANULOCYTES NFR BLD: 0.3 %
LIPASE SERPL-CCNC: 65 U/L (ref 12–53)
LYMPHOCYTES # BLD AUTO: 3.2 X10(3) UL (ref 1–4)
LYMPHOCYTES NFR BLD AUTO: 31.4 %
MCH RBC QN AUTO: 31.9 PG (ref 26–34)
MCHC RBC AUTO-ENTMCNC: 33.1 G/DL (ref 31–37)
MCV RBC AUTO: 96.4 FL
MONOCYTES # BLD AUTO: 0.74 X10(3) UL (ref 0.1–1)
MONOCYTES NFR BLD AUTO: 7.3 %
NEUTROPHILS # BLD AUTO: 5.64 X10 (3) UL (ref 1.5–7.7)
NEUTROPHILS # BLD AUTO: 5.64 X10(3) UL (ref 1.5–7.7)
NEUTROPHILS NFR BLD AUTO: 55.4 %
OSMOLALITY SERPL CALC.SUM OF ELEC: 295 MOSM/KG (ref 275–295)
PLATELET # BLD AUTO: 298 10(3)UL (ref 150–450)
POTASSIUM SERPL-SCNC: 4.2 MMOL/L (ref 3.5–5.1)
PROT SERPL-MCNC: 6.7 G/DL (ref 5.7–8.2)
RBC # BLD AUTO: 3.32 X10(6)UL
RSV RNA SPEC NAA+PROBE: NEGATIVE
SARS-COV-2 RNA RESP QL NAA+PROBE: NOT DETECTED
SODIUM SERPL-SCNC: 136 MMOL/L (ref 136–145)
TROPONIN I SERPL HS-MCNC: 15 NG/L
WBC # BLD AUTO: 10.2 X10(3) UL (ref 4–11)

## 2025-03-30 PROCEDURE — 93005 ELECTROCARDIOGRAM TRACING: CPT

## 2025-03-30 PROCEDURE — 93010 ELECTROCARDIOGRAM REPORT: CPT

## 2025-03-30 PROCEDURE — 99285 EMERGENCY DEPT VISIT HI MDM: CPT

## 2025-03-30 PROCEDURE — 83690 ASSAY OF LIPASE: CPT | Performed by: EMERGENCY MEDICINE

## 2025-03-30 PROCEDURE — 84484 ASSAY OF TROPONIN QUANT: CPT | Performed by: EMERGENCY MEDICINE

## 2025-03-30 PROCEDURE — 80053 COMPREHEN METABOLIC PANEL: CPT | Performed by: EMERGENCY MEDICINE

## 2025-03-30 PROCEDURE — 36415 COLL VENOUS BLD VENIPUNCTURE: CPT

## 2025-03-30 PROCEDURE — 0241U SARS-COV-2/FLU A AND B/RSV BY PCR (GENEXPERT): CPT | Performed by: EMERGENCY MEDICINE

## 2025-03-30 PROCEDURE — 85025 COMPLETE CBC W/AUTO DIFF WBC: CPT | Performed by: EMERGENCY MEDICINE

## 2025-03-30 PROCEDURE — 71045 X-RAY EXAM CHEST 1 VIEW: CPT | Performed by: EMERGENCY MEDICINE

## 2025-03-31 ENCOUNTER — APPOINTMENT (OUTPATIENT)
Dept: CV DIAGNOSTICS | Facility: HOSPITAL | Age: 85
End: 2025-03-31
Attending: INTERNAL MEDICINE
Payer: MEDICARE

## 2025-03-31 ENCOUNTER — APPOINTMENT (OUTPATIENT)
Dept: ULTRASOUND IMAGING | Facility: HOSPITAL | Age: 85
End: 2025-03-31
Attending: INTERNAL MEDICINE
Payer: MEDICARE

## 2025-03-31 VITALS
HEIGHT: 58 IN | DIASTOLIC BLOOD PRESSURE: 70 MMHG | WEIGHT: 120 LBS | OXYGEN SATURATION: 98 % | TEMPERATURE: 98 F | HEART RATE: 79 BPM | BODY MASS INDEX: 25.19 KG/M2 | SYSTOLIC BLOOD PRESSURE: 170 MMHG | RESPIRATION RATE: 16 BRPM

## 2025-03-31 PROBLEM — D64.9 ANEMIA: Status: ACTIVE | Noted: 2025-03-31

## 2025-03-31 PROBLEM — R07.9 ACUTE CHEST PAIN: Status: ACTIVE | Noted: 2025-03-31

## 2025-03-31 LAB
ATRIAL RATE: 70 BPM
ATRIAL RATE: 84 BPM
CHOLEST SERPL-MCNC: 147 MG/DL (ref ?–200)
GLUCOSE BLDC GLUCOMTR-MCNC: 101 MG/DL (ref 70–99)
GLUCOSE BLDC GLUCOMTR-MCNC: 124 MG/DL (ref 70–99)
HDLC SERPL-MCNC: 61 MG/DL (ref 40–59)
LDLC SERPL CALC-MCNC: 67 MG/DL (ref ?–100)
NONHDLC SERPL-MCNC: 86 MG/DL (ref ?–130)
P AXIS: 42 DEGREES
P AXIS: 52 DEGREES
P-R INTERVAL: 136 MS
P-R INTERVAL: 138 MS
Q-T INTERVAL: 358 MS
Q-T INTERVAL: 394 MS
QRS DURATION: 82 MS
QRS DURATION: 84 MS
QTC CALCULATION (BEZET): 423 MS
QTC CALCULATION (BEZET): 425 MS
R AXIS: 2 DEGREES
R AXIS: 8 DEGREES
T AXIS: 35 DEGREES
T AXIS: 53 DEGREES
TRIGL SERPL-MCNC: 103 MG/DL (ref 30–149)
TROPONIN I SERPL HS-MCNC: 15 NG/L
TROPONIN I SERPL HS-MCNC: 18 NG/L
VENTRICULAR RATE: 70 BPM
VENTRICULAR RATE: 84 BPM
VLDLC SERPL CALC-MCNC: 16 MG/DL (ref 0–30)

## 2025-03-31 PROCEDURE — 93880 EXTRACRANIAL BILAT STUDY: CPT | Performed by: INTERNAL MEDICINE

## 2025-03-31 PROCEDURE — 93306 TTE W/DOPPLER COMPLETE: CPT | Performed by: INTERNAL MEDICINE

## 2025-03-31 PROCEDURE — 84484 ASSAY OF TROPONIN QUANT: CPT | Performed by: EMERGENCY MEDICINE

## 2025-03-31 PROCEDURE — 84484 ASSAY OF TROPONIN QUANT: CPT | Performed by: HOSPITALIST

## 2025-03-31 PROCEDURE — 80061 LIPID PANEL: CPT | Performed by: HOSPITALIST

## 2025-03-31 PROCEDURE — 82962 GLUCOSE BLOOD TEST: CPT

## 2025-03-31 PROCEDURE — 76376 3D RENDER W/INTRP POSTPROCES: CPT | Performed by: INTERNAL MEDICINE

## 2025-03-31 PROCEDURE — 93005 ELECTROCARDIOGRAM TRACING: CPT

## 2025-03-31 PROCEDURE — 94664 DEMO&/EVAL PT USE INHALER: CPT

## 2025-03-31 RX ORDER — HYDROCHLOROTHIAZIDE 12.5 MG/1
12.5 TABLET ORAL DAILY
Status: DISCONTINUED | OUTPATIENT
Start: 2025-04-01 | End: 2025-03-31

## 2025-03-31 RX ORDER — CLOPIDOGREL BISULFATE 75 MG/1
75 TABLET ORAL DAILY
Status: DISCONTINUED | OUTPATIENT
Start: 2025-03-31 | End: 2025-03-31

## 2025-03-31 RX ORDER — DILTIAZEM HYDROCHLORIDE 120 MG/1
240 CAPSULE, EXTENDED RELEASE ORAL DAILY
Status: DISCONTINUED | OUTPATIENT
Start: 2025-03-31 | End: 2025-03-31

## 2025-03-31 RX ORDER — HYDRALAZINE HYDROCHLORIDE 25 MG/1
25 TABLET, FILM COATED ORAL ONCE
Status: COMPLETED | OUTPATIENT
Start: 2025-03-31 | End: 2025-03-31

## 2025-03-31 RX ORDER — ACETAMINOPHEN 325 MG/1
650 TABLET ORAL EVERY 6 HOURS PRN
Status: DISCONTINUED | OUTPATIENT
Start: 2025-03-31 | End: 2025-03-31

## 2025-03-31 RX ORDER — BISACODYL 10 MG
10 SUPPOSITORY, RECTAL RECTAL
Status: DISCONTINUED | OUTPATIENT
Start: 2025-03-31 | End: 2025-03-31

## 2025-03-31 RX ORDER — LOSARTAN POTASSIUM 100 MG/1
100 TABLET ORAL DAILY
Status: DISCONTINUED | OUTPATIENT
Start: 2025-04-01 | End: 2025-03-31

## 2025-03-31 RX ORDER — MONTELUKAST SODIUM 10 MG/1
10 TABLET ORAL NIGHTLY
Status: DISCONTINUED | OUTPATIENT
Start: 2025-04-01 | End: 2025-03-31

## 2025-03-31 RX ORDER — BUPROPION HYDROCHLORIDE 100 MG/1
100 TABLET, EXTENDED RELEASE ORAL DAILY
Status: DISCONTINUED | OUTPATIENT
Start: 2025-03-31 | End: 2025-03-31

## 2025-03-31 RX ORDER — HEPARIN SODIUM 5000 [USP'U]/ML
5000 INJECTION, SOLUTION INTRAVENOUS; SUBCUTANEOUS EVERY 8 HOURS SCHEDULED
Status: DISCONTINUED | OUTPATIENT
Start: 2025-03-31 | End: 2025-03-31

## 2025-03-31 RX ORDER — NIFEDIPINE 60 MG/1
60 TABLET, EXTENDED RELEASE ORAL 2 TIMES DAILY
Status: DISCONTINUED | OUTPATIENT
Start: 2025-03-31 | End: 2025-03-31

## 2025-03-31 RX ORDER — POLYETHYLENE GLYCOL 3350 17 G/17G
17 POWDER, FOR SOLUTION ORAL DAILY PRN
Status: DISCONTINUED | OUTPATIENT
Start: 2025-03-31 | End: 2025-03-31

## 2025-03-31 RX ORDER — HYDROCHLOROTHIAZIDE 12.5 MG/1
12.5 TABLET ORAL DAILY
COMMUNITY

## 2025-03-31 RX ORDER — ONDANSETRON 2 MG/ML
4 INJECTION INTRAMUSCULAR; INTRAVENOUS EVERY 6 HOURS PRN
Status: DISCONTINUED | OUTPATIENT
Start: 2025-03-31 | End: 2025-03-31

## 2025-03-31 RX ORDER — SENNOSIDES 8.6 MG
17.2 TABLET ORAL NIGHTLY PRN
Status: DISCONTINUED | OUTPATIENT
Start: 2025-03-31 | End: 2025-03-31

## 2025-03-31 NOTE — ED PROVIDER NOTES
Patient Seen in: [facility] Emergency Department      History     Chief Complaint   Patient presents with    Chest Pain Angina     Stated Complaint: chest pain    Subjective:   The history is provided by the patient and a relative (daughter gives most of the history). The history is limited by a language barrier.  used: Irish.         84 year old female with h/o asthma, partial blindness, dm, htn who presents with fatigue, CP that started around 4am yesterday and persisted throughout the day.   +temporary improved with aspirin, but then returned  +inc belching for some time    Objective:     Past Medical History:    Asthma (HCC)    Blind right eye    Diabetes (HCC)    Essential hypertension    Ischemic optic neuropathy of right eye    Optic papillitis of left eye              Past Surgical History:   Procedure Laterality Date    Cataract extraction      right    Total abdom hysterectomy                  Social History     Socioeconomic History    Marital status:    Tobacco Use    Smoking status: Never    Smokeless tobacco: Never   Vaping Use    Vaping status: Never Used   Substance and Sexual Activity    Alcohol use: Never    Drug use: Never     Social Drivers of Health     Food Insecurity: No Food Insecurity (3/31/2025)    NCSS - Food Insecurity     Worried About Running Out of Food in the Last Year: No     Ran Out of Food in the Last Year: No   Transportation Needs: No Transportation Needs (3/31/2025)    NCSS - Transportation     Lack of Transportation: No   Housing Stability: Not At Risk (3/31/2025)    NCSS - Housing/Utilities     Has Housing: Yes     Worried About Losing Housing: No     Unable to Get Utilities: No                  Physical Exam     ED Triage Vitals [03/30/25 2227]   /69   Pulse 78   Resp 20   Temp 97.7 °F (36.5 °C)   Temp src Temporal   SpO2 97 %   O2 Device None (Room air)       Current Vitals:   Vital Signs  BP: (!) 178/74  Pulse: 73  Resp: 18  Temp:  97.9 °F (36.6 °C)  Temp src: Oral  MAP (mmHg): (!) 102    Oxygen Therapy  SpO2: 98 %  O2 Device: None (Room air)        Physical Exam  Vitals and nursing note reviewed.   Constitutional:       General: She is not in acute distress.     Appearance: Normal appearance. She is well-developed. She is not ill-appearing, toxic-appearing or diaphoretic.   HENT:      Head: Normocephalic and atraumatic.   Eyes:      Conjunctiva/sclera: Conjunctivae normal.      Pupils: Pupils are equal, round, and reactive to light.   Cardiovascular:      Rate and Rhythm: Normal rate and regular rhythm.      Pulses: Normal pulses.      Heart sounds: Normal heart sounds. No murmur heard.  Pulmonary:      Effort: Pulmonary effort is normal. No respiratory distress.      Breath sounds: Normal breath sounds. No wheezing.   Abdominal:      General: There is no distension.      Palpations: Abdomen is soft.      Tenderness: There is no abdominal tenderness. There is no guarding.   Musculoskeletal:         General: No tenderness. Normal range of motion.      Cervical back: Full passive range of motion without pain, normal range of motion and neck supple. No rigidity. Normal range of motion.      Right lower leg: No edema.      Left lower leg: No edema.   Skin:     General: Skin is warm and dry.      Findings: No rash.   Neurological:      Mental Status: She is alert and oriented to person, place, and time.      GCS: GCS eye subscore is 4. GCS verbal subscore is 5. GCS motor subscore is 6.      Sensory: Sensation is intact. No sensory deficit.      Motor: Motor function is intact. No weakness.   Psychiatric:         Attention and Perception: Attention normal.         Mood and Affect: Mood normal.         Behavior: Behavior normal. Behavior is cooperative.           ED Course     Labs Reviewed   COMP METABOLIC PANEL (14) - Abnormal; Notable for the following components:       Result Value    Glucose 171 (*)     BUN 37 (*)     Creatinine 1.41 (*)      BUN/CREA Ratio 26.2 (*)     eGFR-Cr 37 (*)     All other components within normal limits   CBC WITH DIFFERENTIAL WITH PLATELET - Abnormal; Notable for the following components:    RBC 3.32 (*)     HGB 10.6 (*)     HCT 32.0 (*)     All other components within normal limits   LIPASE - Abnormal; Notable for the following components:    Lipase 65 (*)     All other components within normal limits   LIPID PANEL - Abnormal; Notable for the following components:    HDL Cholesterol 61 (*)     All other components within normal limits   POCT GLUCOSE - Abnormal; Notable for the following components:    POC Glucose  101 (*)     All other components within normal limits   TROPONIN I HIGH SENSITIVITY - Normal   TROPONIN I HIGH SENSITIVITY - Normal   SARS-COV-2/FLU A AND B/RSV BY PCR (GENEXPERT) - Normal    Narrative:     This test is intended for the qualitative detection and differentiation of SARS-CoV-2, influenza A, influenza B, and respiratory syncytial virus (RSV) viral RNA in nasopharyngeal or nares swabs from individuals suspected of respiratory viral infection consistent with COVID-19 by their healthcare provider. Signs and symptoms of respiratory viral infection due to SARS-CoV-2, influenza, and RSV can be similar.    Test performed using the Xpert Xpress SARS-CoV-2/FLU/RSV (real time RT-PCR)  assay on the [employer] instrument, "[employer] [place] [address].   This test is being used under the Food and Drug Administration's Emergency Use Authorization.    The authorized Fact Sheet for Healthcare Providers for this assay is available upon request from the laboratory.   TROPONIN I HIGH SENSITIVITY   RAINBOW DRAW LAVENDER   RAINBOW DRAW LIGHT GREEN   RAINBOW DRAW BLUE     EKG    Rate, intervals and axes as noted on EKG Report.  Rate: 84  Rhythm: Sinus Rhythm  Reading: NSR    EKG    Rate, intervals and axes as noted on EKG Report.  Rate: 70  Rhythm: Sinus Rhythm  Reading: NSR             ED Course as of 03/31/25  0435  ------------------------------------------------------------  Time: 03/31 0019  Value: XR CHEST AP PORTABLE  (CPT=71045)  Comment: X-RAY CHEST 1 VIEW 2317 HRS.       IMPRESSION:  Low lung volumes with vascular crowding.  Otherwise no evidence of acute cardiopulmonary disease.            MDM      Pulse Ox: 98%, Normal, RA    Medications   clopidogrel (Plavix) tab 75 mg (has no administration in time range)   heparin (Porcine) 5000 UNIT/ML injection 5,000 Units (has no administration in time range)   polyethylene glycol (PEG 3350) (Miralax) 17 g oral packet 17 g (has no administration in time range)   sennosides (Senokot) tab 17.2 mg (has no administration in time range)   bisacodyl (Dulcolax) 10 MG rectal suppository 10 mg (has no administration in time range)   ondansetron (Zofran) 4 MG/2ML injection 4 mg (has no administration in time range)       This a patient with multiple risk factors including diabetes, hypertension who presents with chest pain throughout the day today that then radiated into her left arm.  Initial EKGs with no acute concerning findings, trop negative.  Given her risk factors, age, will admit for further cardiac workup.    D/w Dr Garcia - will admit  Consult placed to Dr Eugene for a.m consult    Admission disposition: 3/31/2025  2:18 AM           Medical Decision Making      Disposition and Plan     Clinical Impression:  1. Acute chest pain         Disposition:  Admit  3/31/2025  2:18 am    Follow-up:  No follow-up provider specified.  We recommend that you schedule follow up care with a primary care provider within the next three months to obtain basic health screening including reassessment of your blood pressure.      Medications Prescribed:  Current Discharge Medication List              Supplementary Documentation:         Hospital Problems       Present on Admission  Date Reviewed: 10/12/2023            ICD-10-CM Noted POA    * (Principal) Acute chest pain R07.9 3/31/2025 Unknown     Anemia D64.9 3/31/2025 Yes

## 2025-03-31 NOTE — H&P
[facility]   INTERNAL MEDICINE HISTORY & PHYSICAL    Subjective:   CHIEF COMPLAINT   Chest Pain Angina    HISTORY OF THE PRESENT ILLNESS    History obtained from patient w/ additional history from DIL at bedside and 2 children over phone.  DIL assissted with translating per preference  Lani Farfanjessicabeverley - 84 year old female presents with chest pain.  Unclear sx, lot of OA whcihi could be contributing. Ate some rich pasta with red sauce day before & had lentil soup prior - poss contributing. No known MI hx. Chest discomfort subsequently c/b LUE & neck discomfort.    In ED, afebrile, hypertensive, Trop neg x 2. CBC/BMP essentially unremarkable. Trop neg x 2. Lipase very minimally elevated. COVID/Flu A&B/RSV PCR swab neg. CXR reviewed - no acute findings. EKG tracing reviewed - no acute ischemic changes. Cardiology consulted. Admitted for further monitoring, testing, & treatment.     REVIEW OF SYSTEMS   Remainder of 12-point ROS negative unless discussed in HPI.     PATIENT HISTORIES  PMHx:  She has a past medical history of Asthma ([facility]), Blind right eye, Diabetes (HCC), Essential hypertension, Ischemic optic neuropathy of right eye, and Optic papillitis of left eye.  PSHx:  She has a past surgical history that includes total abdom hysterectomy and Cataract extraction.   FHx:  Her family history is not on file.   SHx: She reports that she has never smoked. She has never used smokeless tobacco. She reports that she does not drink alcohol and does not use drugs.    Allergies: She is allergic to amoxicillin-pot clavulanate and cephalexin.     Current Outpatient Medications   Medication Instructions    buPROPion SR (WELLBUTRIN SR) 100 mg, Daily    clopidogrel (PLAVIX) 75 mg, Oral, Daily    dilTIAZem HCl ER (DILACOR XR) 240 mg, Oral, Daily    hydroCHLOROthiazide 12.5 mg, Daily    losartan (COZAAR) 100 mg, Daily    Mometasone Furoate 100 mcg, Inhalation, 2 times daily    montelukast (SINGULAIR) 10 mg, Nightly     NIFEdipine ER (ADALAT CC) 60 mg, 2 times daily       Objective:    PHYSICAL EXAMINATION   Vitals: BP (!) 170/70 (BP Location: Left arm)   Pulse 79   Temp 97.9 °F (36.6 °C) (Oral)   Resp 16   Ht 4' 10\" (1.473 m)   Wt 120 lb (54.4 kg)   SpO2 98%   BMI 25.08 kg/m²   Gen: NAD, well nourished  Eyes: PERRLA, normal conjunctivae  ENMT: Moist mucous membranes, trachea midline, no pharyngeal erythema, no thyromegaly  CV: Murmur, no M/R/G, no peripheral edema  Resp: CTAB, non-labored respirations, symmetric expansion  GI: Soft, NT, ND, + BS, no rebound, no guarding  MSK:  No C/C/E, normal active/passive ROM in C-spine, 5/5 strength in all extremities  Skin: No rashes, visible skin w/o worrisome lesions   Neuro: CN 2-12 grossly intact, sensation intact   Psych: A&Ox3, appropriate mood, conversant w/ linear thought process     DATA REVIEW: LABORATORY VALUES & DIAGNOSTICS  Recent Labs   Lab 03/30/25  2314      K 4.2      CO2 21.0   BUN 37*   CREATSERUM 1.41*   ANIONGAP 10   *   CA 9.2   TP 6.7   ALB 4.5   AST 16   ALT 16   ALKPHO 96   BILT 0.2   EGFRCR 37*   LIP 65*     Recent Labs   Lab 03/30/25  2313   WBC 10.2   HGB 10.6*   HCT 32.0*   .0   MCV 96.4   MOPERCENT 7.3   EOPERCENT 5.2   BAPERCENT 0.4     Assessment & Plan:    Lani Damon - 84 year old female admitted 3/30/2025 for eval of chest pain.    Chest pain  Murmur  HTN  - Unclear etiology. Trop and EKG less concerning for ACS. Sx seem more aligned with GI etiology based on hx but LUE & neck discomfort are concern for cardiac etiology. CXR w/o PTX/PNA. No tenderness to palpation. Not tachycardic, no hemoptysis, Wells Score 1.3% chance of PE.   - continue PTA meds - pt very resistant to starting any new medications  - Tele w/o acute findings  - Cardiology consulted  - echo, carotid doppers without acute findings  - Stress test recommended, patient declined    Hypertension  Hypertensive urgency  - /80 in ER  - BP improved  with resuming PTA meds. With age, GoC, reasonable to allow more lenient BP control    H/o CVA    DM2   - Last A1c was 6. Metformin is hard on her stomach. Given her goals, age, and side effects causing more discomfort, I believe stopping metformin is reasonable.     H/o optic neuritis/temporal arteritis  - no longer on steroids    Chronic pain  - apap, naproxen, PT/OT, HHC    GoC    ** Extensive conversations with patient & DIL & subsequently repeating conversation with 2 children over phone re: stress test.   Patient declines testing, family initially preferred pursuing but after discussing GoC, we will honor Mrs. Damon wishes. Regardless of results, she would NOT want to pursue invasive testing and she would NOT want to be on even more meds if interventions/MERNA necessary. We discussed risks including death - family note that patient has a lot of pain & has made statements about being at peace with her mortality when the time comes. All understand that untreated coronary disease could contribute to her demise. In event of cardiac or respiratory failure - she would NOT wish to be resuscitated. No CPR  DNI confirmed & order updated in chart    F/u:   - PCP:  Damon Beintez MD  - Cardiology PRN    110 minutes spent on patient care. Time spent for chart review/summarization, obtaining history, physical examination, extensive discussion with patient/family, d/w consultants, nursing, SW/CM. 50% of time for F2F, counseling, coordination of care.     Available via epic secure chat or perfect serve 7A - 7P.    Yury Fuller MD   Internal Medicine - [facility]  [facility]

## 2025-03-31 NOTE — ED QUICK NOTES
Orders for admission, patient is aware of plan and ready to go upstairs. Any questions, please call ED JAIDA streeter at extension [phone].     Patient Covid vaccination status: Unvaccinated     COVID Test Ordered in ED: SARS-CoV-2/Flu A and B/RSV by PCR (GeneXpert)    COVID Suspicion at Admission: N/A    Running Infusions:  None    Mental Status/LOC at time of transport: ao x 4, [place], speaks mostly Hebrew.    Other pertinent information:   CIWA score: N/A   NIH score:  N/A

## 2025-03-31 NOTE — PLAN OF CARE
Pt A/Ox3-4. One assist. MD notified of elevated BP overnight, see orders. Echo ordered.     Problem: Patient Centered Care  Goal: Patient preferences are identified and integrated in the patient's plan of care  Description: Interventions:- What would you like us to know as we care for you? I would like to return home. - Provide timely, complete, and accurate information to patient/family- Incorporate patient and family knowledge, values, beliefs, and cultural backgrounds into the planning and delivery of care- Encourage patient/family to participate in care and decision-making at the level they choose- Honor patient and family perspectives and choices  Outcome: Progressing     Problem: Diabetes/Glucose Control  Goal: Glucose maintained within prescribed range  Description: INTERVENTIONS:- Monitor Blood Glucose as ordered- Assess for signs and symptoms of hyperglycemia and hypoglycemia- Administer ordered medications to maintain glucose within target range- Assess barriers to adequate nutritional intake and initiate nutrition consult as needed- Instruct patient on self management of diabetes  Outcome: Progressing     Problem: Patient/Family Goals  Goal: Patient/Family Long Term Goal  Description: Patient's Long Term Goal: Get stronger. Interventions:- Ambulate often. - See additional Care Plan goals for specific interventions  Outcome: Progressing  Goal: Patient/Family Short Term Goal  Description: Patient's Short Term Goal: Ask frequent questions. Interventions: - Work with staff on ways to improve overall health status. - See additional Care Plan goals for specific interventions  Outcome: Progressing

## 2025-03-31 NOTE — PLAN OF CARE
Patient is alert and oriented. Patient cleared to discharge home with outpatient follow up.  AVS completed and discussed with patient and family member, there were no further questions. Patient to transport home by family.   Problem: Patient Centered Care  Goal: Patient preferences are identified and integrated in the patient's plan of care  Description: Interventions:- What would you like us to know as we care for you? From home alone - Provide timely, complete, and accurate information to patient/family- Incorporate patient and family knowledge, values, beliefs, and cultural backgrounds into the planning and delivery of care- Encourage patient/family to participate in care and decision-making at the level they choose- Honor patient and family perspectives and choices  Outcome: Adequate for Discharge     Problem: Diabetes/Glucose Control  Goal: Glucose maintained within prescribed range  Description: INTERVENTIONS:- Monitor Blood Glucose as ordered- Assess for signs and symptoms of hyperglycemia and hypoglycemia- Administer ordered medications to maintain glucose within target range- Assess barriers to adequate nutritional intake and initiate nutrition consult as needed- Instruct patient on self management of diabetes  Outcome: Adequate for Discharge     Problem: Patient/Family Goals  Goal: Patient/Family Long Term Goal  Description: Patient's Long Term Goal: Be able to discharge Interventions:- Medications administration, diagnostic imaging. - See additional Care Plan goals for specific interventions  Outcome: Adequate for Discharge  Goal: Patient/Family Short Term Goal  Description: Patient's Short Term Goal: Resolve chest pain Interventions: - Diagnostic imaging and medication administration - See additional Care Plan goals for specific interventions  Outcome: Adequate for Discharge

## 2025-03-31 NOTE — ED QUICK NOTES
CP that started today, took asa x 3 today with relief of the pain. Pt also c/o some transient abdominal pain. Hx of murmur. Pt is currently pain free. Non toxic appearing.

## 2025-03-31 NOTE — ED INITIAL ASSESSMENT (HPI)
Pt in wheel chair through triage c/o chest pain overnight. Took 81mg aspirin and felt better. Denies [place].      Pt refused , daughter to interpret.

## 2025-03-31 NOTE — DISCHARGE INSTRUCTIONS
Check your blood pressure every day and keep a log of the recordings. Bring this with you to your appointments.   If you notice that 85% of your blood pressure measurements are consistently over 140/80 or less than 100/60 please call your doctor .  Try to eat a low sodium diet (<1500mg/day) as it will naturally help to reduce your blood pressure.    Hold your blood pressure medications for any of the following:  If systolic blood pressure (top number) is less than 110  If diastolic blood pressure (bottom number), is less than 50  If heart rate is less than 50

## 2025-03-31 NOTE — CONSULTS
[facility] CARDIOLOGY CONSULT      Lani Damon is a 84 year old female who I assessed as follows:  Chief Complaint   Patient presents with    Chest Pain Angina          REASON FOR CONSULTATION:    chest pain            ASSESSMENT/PLAN:     IMPRESSIONS:  Chest pain, atypical  HTN  DM  Murmur  Left carotid bruit vs radiation of murmur        PLAN:  Echo  Carotid doppler  Pharm nuclear stress  ECGs reviewed  Follow BP. Find out BP meds and resume            --------------------------------------------------------------------------------------------------------------------------------    HPI:         History of HTN, DM presents with intermittent chest pain since 4AM yesterday, not related to exertion, middle chest, sharp, resolve on their own, not pleuritic.     On BP meds. No prior heart issues.             No current outpatient medications on file.      Past Medical History:    Asthma (HCC)    Blind right eye    Diabetes (HCC)    Essential hypertension    Ischemic optic neuropathy of right eye    Optic papillitis of left eye     Past Surgical History:   Procedure Laterality Date    Cataract extraction      right    Total abdom hysterectomy       History reviewed. No pertinent family history.   Social History:  Social History     Socioeconomic History    Marital status:    Tobacco Use    Smoking status: Never    Smokeless tobacco: Never   Vaping Use    Vaping status: Never Used   Substance and Sexual Activity    Alcohol use: Never    Drug use: Never     Social Drivers of Health     Food Insecurity: No Food Insecurity (3/31/2025)    NCSS - Food Insecurity     Worried About Running Out of Food in the Last Year: No     Ran Out of Food in the Last Year: No   Transportation Needs: No Transportation Needs (3/31/2025)    NCSS - Transportation     Lack of Transportation: No   Housing Stability: Not At Risk (3/31/2025)    NCSS - Housing/Utilities     Has Housing: Yes     Worried About Losing Housing:  No     Unable to Get Utilities: No          Medications:  Current Facility-Administered Medications   Medication Dose Route Frequency    clopidogrel (Plavix) tab 75 mg  75 mg Oral Daily    heparin (Porcine) 5000 UNIT/ML injection 5,000 Units  5,000 Units Subcutaneous Q8H LONG    polyethylene glycol (PEG 3350) (Miralax) 17 g oral packet 17 g  17 g Oral Daily PRN    sennosides (Senokot) tab 17.2 mg  17.2 mg Oral Nightly PRN    bisacodyl (Dulcolax) 10 MG rectal suppository 10 mg  10 mg Rectal Daily PRN    ondansetron (Zofran) 4 MG/2ML injection 4 mg  4 mg Intravenous Q6H PRN    acetaminophen (Tylenol) tab 650 mg  650 mg Oral Q6H PRN           Allergies  Allergies[1]            REVIEW OF SYSTEMS:   GENERAL HEALTH: no fevers  SKIN: denies any unusual skin lesions or rashes   EARS: no recent changes in hearing  EYE: no recent vision changes  THROAT: denies sore throat  RESPIRATORY: denies cough or shortness of breath with exertion  CARDIOVASCULAR: chest pain  GI: denies abdominal pain, nausea and vomiting  NEURO: denies headaches and focal neurologic symptoms  PSYCH: no recent depression  ENDOCRINE: no change in sleep pattern  HEME: no easy bruising  All other systems reviewed and negative.          EXAM:         TELE: SR          BP (!) 172/66 (BP Location: Right arm)   Pulse 70   Temp 97.7 °F (36.5 °C) (Oral)   Resp 18   Ht 4' 10\" (1.473 m)   Wt 120 lb (54.4 kg)   SpO2 96%   BMI 25.08 kg/m²   Temp (24hrs), Av.8 °F (36.6 °C), Min:97.7 °F (36.5 °C), Max:97.9 °F (36.6 °C)    Wt Readings from Last 3 Encounters:   25 120 lb (54.4 kg)   25 120 lb (54.4 kg)   10/09/17 154 lb (69.9 kg)       No intake or output data in the 24 hours ending 25 0658      GENERAL: well developed, well nourished, in no apparent distress  SKIN: no rashes  HEENT: atraumatic, normocephalic, throat without erythema  NECK: supple, left carotid bruit vs radiation of murmur  LUNGS: clear to auscultation  CARDIO: RRR with 2/6  systolic murmur   GI: soft, nontender  EXTREMITIES: no cyanosis, clubbing or edema  NEUROLOGY: alert  PSYCH: cooperative          LABORATORY DATA:               ECG:        ECHO:          Labs:  Recent Labs   Lab 03/30/25  2314   *   BUN 37*   CREATSERUM 1.41*   CA 9.2      K 4.2      CO2 21.0     No results for input(s): \"TROP\" in the last 168 hours.  Recent Labs   Lab 03/30/25  2313   RBC 3.32*   HGB 10.6*   HCT 32.0*   MCV 96.4   MCH 31.9   MCHC 33.1   RDW 12.8   NEPRELIM 5.64   WBC 10.2   .0     Recent Labs   Lab 03/30/25  2314 03/31/25  0123 03/31/25  0448   TROPHS 15 15 18       Imaging:  No results found.         Jose Pierce MD        [1]   Allergies  Allergen Reactions    Amoxicillin-Pot Clavulanate NAUSEA AND VOMITING     Augmentin    Cephalexin NAUSEA AND VOMITING

## (undated) NOTE — IP AVS SNAPSHOT
[facility]            (For Outpatient Use Only) Initial Admit Date: 8/15/2017   Inpt/Obs Admit Date: Inpt: 8/15/17 / Obs: N/A   Discharge Date:    Sam Parra:  [de-identified]   MRN: [de-identified]   CSN: [id number]        ENCOUNTER  Patient Class Subscriber ID:  Pt Rel to Subscriber:    Hospital Account Financial Class: Medicare    August 17, 2017